# Patient Record
Sex: MALE | Race: BLACK OR AFRICAN AMERICAN | Employment: FULL TIME | URBAN - METROPOLITAN AREA
[De-identification: names, ages, dates, MRNs, and addresses within clinical notes are randomized per-mention and may not be internally consistent; named-entity substitution may affect disease eponyms.]

---

## 2017-09-27 ENCOUNTER — HOSPITAL ENCOUNTER (OUTPATIENT)
Age: 42
Discharge: HOME | End: 2017-09-27
Attending: FAMILY MEDICINE
Payer: COMMERCIAL

## 2017-09-27 VITALS
WEIGHT: 278 LBS | BODY MASS INDEX: 37 KG/M2 | DIASTOLIC BLOOD PRESSURE: 98 MMHG | SYSTOLIC BLOOD PRESSURE: 134 MMHG | HEART RATE: 103 BPM | TEMPERATURE: 99 F | RESPIRATION RATE: 18 BRPM | OXYGEN SATURATION: 95 %

## 2017-09-27 DIAGNOSIS — T14.8XXA LOCAL INFECTION OF WOUND: Primary | ICD-10-CM

## 2017-09-27 DIAGNOSIS — L08.9 LOCAL INFECTION OF WOUND: Primary | ICD-10-CM

## 2017-09-27 DIAGNOSIS — IMO0001 UNCONTROLLED TYPE 2 DIABETES MELLITUS WITHOUT COMPLICATION, WITHOUT LONG-TERM CURRENT USE OF INSULIN: ICD-10-CM

## 2017-09-27 LAB — GLUCOSE BLD-MCNC: 292 MG/DL (ref 65–99)

## 2017-09-27 PROCEDURE — 90471 IMMUNIZATION ADMIN: CPT

## 2017-09-27 PROCEDURE — 99204 OFFICE O/P NEW MOD 45 MIN: CPT

## 2017-09-27 PROCEDURE — 99203 OFFICE O/P NEW LOW 30 MIN: CPT

## 2017-09-27 PROCEDURE — 82962 GLUCOSE BLOOD TEST: CPT

## 2017-09-27 RX ORDER — CEPHALEXIN 500 MG/1
500 CAPSULE ORAL 3 TIMES DAILY
Qty: 21 CAPSULE | Refills: 0 | Status: SHIPPED | OUTPATIENT
Start: 2017-09-27 | End: 2017-09-28

## 2017-09-27 NOTE — ED PROVIDER NOTES
Patient Seen in: [address]    History   Patient presents with:  Laceration Abrasion (integumentary)    Stated Complaint: leg laceration x3 days    HPI    John Singh is a 43year old male previous history of diabetes kvng other systems reviewed and negative except as noted above. PSFH elements reviewed from today and agreed except as otherwise stated in HPI.     Physical Exam   ED Triage Vitals [09/27/17 1404]  BP: 134/98  Pulse: 103  Resp: 18  Temp: 98.8 °F (37.1 °C)  Te Prescribed:  Current Discharge Medication List    START taking these medications    cephALEXin (KEFLEX) 500 MG Oral Cap  Take 1 capsule (500 mg total) by mouth 3 (three) times daily.   Qty: 21 capsule Refills: 0

## 2017-09-27 NOTE — ED NOTES
Pt reports he does not take any of his prescribed medications, has stopped taking his diabetic medications for months now, hasn't seen a doctor since the beginning of the year.  Pt requesting info about [facility] [address], states he might want to get a

## 2017-09-27 NOTE — ED INITIAL ASSESSMENT (HPI)
Pt c/o laceration to right lower leg x 5 days ago from a plastic bin at home after stepping in between plastic bins. Pt reports he cleaned it initially with hydrogen peroxide, and has been putting ointment on it since.  Pt reports he as noticed some redness

## 2017-09-28 ENCOUNTER — OFFICE VISIT (OUTPATIENT)
Dept: INTERNAL MEDICINE CLINIC | Facility: CLINIC | Age: 42
End: 2017-09-28

## 2017-09-28 VITALS
BODY MASS INDEX: 35.98 KG/M2 | WEIGHT: 265.63 LBS | TEMPERATURE: 98 F | HEART RATE: 88 BPM | HEIGHT: 72 IN | DIASTOLIC BLOOD PRESSURE: 76 MMHG | RESPIRATION RATE: 12 BRPM | SYSTOLIC BLOOD PRESSURE: 108 MMHG

## 2017-09-28 DIAGNOSIS — L03.115 CELLULITIS OF RIGHT LEG: Primary | ICD-10-CM

## 2017-09-28 DIAGNOSIS — E78.5 HYPERLIPIDEMIA, UNSPECIFIED HYPERLIPIDEMIA TYPE: ICD-10-CM

## 2017-09-28 DIAGNOSIS — I25.10 ATHEROSCLEROSIS OF CORONARY ARTERY OF NATIVE HEART WITHOUT ANGINA PECTORIS, UNSPECIFIED VESSEL OR LESION TYPE: ICD-10-CM

## 2017-09-28 DIAGNOSIS — E11.65 TYPE 2 DIABETES MELLITUS WITH HYPERGLYCEMIA, WITHOUT LONG-TERM CURRENT USE OF INSULIN (HCC): ICD-10-CM

## 2017-09-28 DIAGNOSIS — K76.0 FATTY LIVER DISEASE, NONALCOHOLIC: ICD-10-CM

## 2017-09-28 PROCEDURE — 99204 OFFICE O/P NEW MOD 45 MIN: CPT | Performed by: INTERNAL MEDICINE

## 2017-09-28 RX ORDER — PIOGLITAZONEHYDROCHLORIDE 30 MG/1
30 TABLET ORAL DAILY
Qty: 30 TABLET | Refills: 3 | Status: SHIPPED | OUTPATIENT
Start: 2017-09-28 | End: 2018-02-11

## 2017-09-28 RX ORDER — CLINDAMYCIN HYDROCHLORIDE 300 MG/1
300 CAPSULE ORAL 3 TIMES DAILY
Qty: 21 CAPSULE | Refills: 0 | Status: SHIPPED | OUTPATIENT
Start: 2017-09-28 | End: 2017-10-05

## 2017-09-28 RX ORDER — ATORVASTATIN CALCIUM 40 MG/1
40 TABLET, FILM COATED ORAL NIGHTLY
Qty: 30 TABLET | Refills: 3 | Status: SHIPPED | OUTPATIENT
Start: 2017-09-28 | End: 2018-02-11

## 2017-09-28 NOTE — PROGRESS NOTES
[facility]    CHIEF COMPLAINT:  Patient presents with:  Diabetes: Had eye exam 7/17. Pt doesn't know name of facility or doctor's name. Will call with name.    Declines flu shot        HISTORY OF PRESENT ILLNESS:  The patient is a 43year old ye mg total) by mouth daily. Disp: 30 tablet Rfl: 3   atorvastatin (LIPITOR) 40 MG Oral Tab Take 1 tablet (40 mg total) by mouth nightly. For cholesterol.  Disp: 30 tablet Rfl: 3   Clindamycin HCl 300 MG Oral Cap Take 1 capsule (300 mg total) by mouth 3 (three COUNSELING  He is active at work. Works at [employer]. Gets in about 10,000 steps a day.      PAIN ASSESSMENT (Patient reports Pain):No       FALL ASSESSMENT:    Falls in the last 12 months: No    FUNCTIONAL ASSESSMENT (Able to perform all ADLs):  Yes 1.3 (H) 11/12/2016 06:54 AM   TSH 1.69 11/12/2016 06:54 AM          Lab Results  Component Value Date/Time   CHOLEST 250 (H) 11/12/2016 06:54 AM   HDL 31 11/12/2016 06:54 AM   TRIG 496 (H) 11/12/2016 06:54 AM   LDL  11/12/2016 06:54 AM   Comment:     Amanda Refill: 3  - COMP METABOLIC PANEL (14); Future  - LIPID PANEL; Future    4. Cellulitis of right leg  Already improving but keflex causing diarrhea. Stop keflex. Start clindamycin.   - Clindamycin HCl 300 MG Oral Cap;  Take 1 capsule (300 mg total) by mo

## 2017-09-30 ENCOUNTER — LAB ENCOUNTER (OUTPATIENT)
Dept: LAB | Facility: HOSPITAL | Age: 42
End: 2017-09-30
Attending: INTERNAL MEDICINE
Payer: COMMERCIAL

## 2017-09-30 DIAGNOSIS — E11.65 TYPE 2 DIABETES MELLITUS WITH HYPERGLYCEMIA, WITHOUT LONG-TERM CURRENT USE OF INSULIN (HCC): ICD-10-CM

## 2017-09-30 DIAGNOSIS — E78.5 HYPERLIPIDEMIA, UNSPECIFIED HYPERLIPIDEMIA TYPE: Primary | ICD-10-CM

## 2017-09-30 PROCEDURE — 80061 LIPID PANEL: CPT

## 2017-09-30 PROCEDURE — 83036 HEMOGLOBIN GLYCOSYLATED A1C: CPT

## 2017-09-30 PROCEDURE — 82043 UR ALBUMIN QUANTITATIVE: CPT

## 2017-09-30 PROCEDURE — 36415 COLL VENOUS BLD VENIPUNCTURE: CPT

## 2017-09-30 PROCEDURE — 80053 COMPREHEN METABOLIC PANEL: CPT

## 2017-09-30 PROCEDURE — 82570 ASSAY OF URINE CREATININE: CPT

## 2017-10-09 ENCOUNTER — TELEPHONE (OUTPATIENT)
Dept: INTERNAL MEDICINE CLINIC | Facility: CLINIC | Age: 42
End: 2017-10-09

## 2017-10-09 ENCOUNTER — OFFICE VISIT (OUTPATIENT)
Dept: INTERNAL MEDICINE CLINIC | Facility: CLINIC | Age: 42
End: 2017-10-09

## 2017-10-09 VITALS
HEIGHT: 72 IN | BODY MASS INDEX: 35.72 KG/M2 | TEMPERATURE: 98 F | WEIGHT: 263.69 LBS | DIASTOLIC BLOOD PRESSURE: 76 MMHG | SYSTOLIC BLOOD PRESSURE: 118 MMHG | HEART RATE: 72 BPM

## 2017-10-09 DIAGNOSIS — E78.5 HYPERLIPIDEMIA, UNSPECIFIED HYPERLIPIDEMIA TYPE: Primary | ICD-10-CM

## 2017-10-09 DIAGNOSIS — I25.10 ATHEROSCLEROSIS OF CORONARY ARTERY OF NATIVE HEART WITHOUT ANGINA PECTORIS, UNSPECIFIED VESSEL OR LESION TYPE: ICD-10-CM

## 2017-10-09 DIAGNOSIS — L03.115 CELLULITIS OF RIGHT LOWER EXTREMITY: ICD-10-CM

## 2017-10-09 DIAGNOSIS — E11.65 TYPE 2 DIABETES MELLITUS WITH HYPERGLYCEMIA, WITHOUT LONG-TERM CURRENT USE OF INSULIN (HCC): ICD-10-CM

## 2017-10-09 DIAGNOSIS — K76.0 FATTY LIVER DISEASE, NONALCOHOLIC: ICD-10-CM

## 2017-10-09 PROCEDURE — 99214 OFFICE O/P EST MOD 30 MIN: CPT | Performed by: INTERNAL MEDICINE

## 2017-10-09 RX ORDER — FENOFIBRATE 54 MG/1
54 TABLET ORAL DAILY
Qty: 30 TABLET | Refills: 1 | Status: SHIPPED | OUTPATIENT
Start: 2017-10-09 | End: 2017-12-25

## 2017-10-09 NOTE — PROGRESS NOTES
[address] [facility]    CHIEF COMPLAINT:  Patient presents with: Follow - Up: had eye exam in 7/17 by [facility]. [facility]. Auth signed. Declined flu shot        HISTORY OF PRESENT ILLNESS:  Here for follow up.    Cell laceration healing well.  No pus, no induration or tenderness  LUNGS: clear to auscultation  CARDIO: RRR without murmur  GI: good BS's,no masses, HSM or tenderness  MUSCULOSKELETAL:  no edema, muscle strength normal.     DATA:    Results for orders placed o insulin (HCC)  Sugars still elevated. Check once a day but stagger times. Will need to go up on meds but need sugar readings. Bring in to next visit. 3. Fatty liver disease, nonalcoholic  Urged low fat low carb diet. Regular exercise.      4. At

## 2017-10-09 NOTE — TELEPHONE ENCOUNTER
Incoming (mail or fax):  fax  Received from:  [facility]  Documentation given to:  Paz Deutsch

## 2017-10-09 NOTE — TELEPHONE ENCOUNTER
Sent fax request for info from:  [facility] at fax [phone]  [facility] at fax [phone]

## 2017-10-10 NOTE — TELEPHONE ENCOUNTER
Incoming (mail or fax):  fax  Received from:  [facility]  Documentation given to:  Dr Mithc Montague

## 2017-12-25 DIAGNOSIS — E78.5 HYPERLIPIDEMIA, UNSPECIFIED HYPERLIPIDEMIA TYPE: ICD-10-CM

## 2017-12-28 RX ORDER — FENOFIBRATE 54 MG/1
54 TABLET ORAL DAILY
Qty: 30 TABLET | Refills: 0 | Status: SHIPPED | OUTPATIENT
Start: 2017-12-28

## 2018-02-11 DIAGNOSIS — E11.65 TYPE 2 DIABETES MELLITUS WITH HYPERGLYCEMIA, WITHOUT LONG-TERM CURRENT USE OF INSULIN (HCC): ICD-10-CM

## 2018-02-11 DIAGNOSIS — I25.10 ATHEROSCLEROSIS OF CORONARY ARTERY OF NATIVE HEART WITHOUT ANGINA PECTORIS, UNSPECIFIED VESSEL OR LESION TYPE: ICD-10-CM

## 2018-02-11 DIAGNOSIS — E78.5 HYPERLIPIDEMIA, UNSPECIFIED HYPERLIPIDEMIA TYPE: ICD-10-CM

## 2018-02-13 RX ORDER — PIOGLITAZONEHYDROCHLORIDE 30 MG/1
30 TABLET ORAL DAILY
Qty: 30 TABLET | Refills: 0 | Status: SHIPPED | OUTPATIENT
Start: 2018-02-13

## 2018-02-13 RX ORDER — ATORVASTATIN CALCIUM 40 MG/1
40 TABLET, FILM COATED ORAL NIGHTLY
Qty: 30 TABLET | Refills: 0 | Status: SHIPPED | OUTPATIENT
Start: 2018-02-13 | End: 2018-03-23

## 2018-02-13 NOTE — TELEPHONE ENCOUNTER
Please only set up refills for a 30 day supply since he is due for a visit. Should not have 3 refills.

## 2018-02-13 NOTE — TELEPHONE ENCOUNTER
Last OV relevant to medication: 10/9/17  Last refill date: 9/28/17     #/refills: 30/3  When pt was asked to return for OV: 1 mo  Upcoming appt/reason: none, pt cancelled, left message to call back for OV    Lab Results  Component Value Date   A1C 10.2 (H)

## 2018-03-23 DIAGNOSIS — I25.10 ATHEROSCLEROSIS OF CORONARY ARTERY OF NATIVE HEART WITHOUT ANGINA PECTORIS, UNSPECIFIED VESSEL OR LESION TYPE: ICD-10-CM

## 2018-03-23 DIAGNOSIS — E78.5 HYPERLIPIDEMIA, UNSPECIFIED HYPERLIPIDEMIA TYPE: ICD-10-CM

## 2018-03-23 DIAGNOSIS — E11.65 TYPE 2 DIABETES MELLITUS WITH HYPERGLYCEMIA, WITHOUT LONG-TERM CURRENT USE OF INSULIN (HCC): ICD-10-CM

## 2018-03-23 RX ORDER — ATORVASTATIN CALCIUM 40 MG/1
40 TABLET, FILM COATED ORAL NIGHTLY
Qty: 30 TABLET | Refills: 0 | Status: SHIPPED | OUTPATIENT
Start: 2018-03-23 | End: 2018-07-21

## 2018-03-23 NOTE — TELEPHONE ENCOUNTER
Give #7 tablets  Do all DM labs within the week and see Serenity next week  Bring recording of blood sugars as advised by Dr Wade Lara in october

## 2018-03-23 NOTE — TELEPHONE ENCOUNTER
Medication refilled. ONLY given 7 days worth. Left detailed message on patients answering machine to call back.

## 2018-03-23 NOTE — TELEPHONE ENCOUNTER
Last OV relevant to medication: 10/9/17  Last refill date: 2/13/18     #/refills: 30day/0  When pt was asked to return for OV: 1 mo  Upcoming appt/reason: none, left message to call back for OV    Lab Results  Component Value Date   A1C 10.2 (H) 09/30/2017

## 2019-04-05 NOTE — LETTER
11/08/17        Matias Rubio  [address] [place] [address]    7/13/1975     Dear Liudmila Hussein records indicate that you have outstanding lab work and or testing that was ordered for you and has not yet been completed:          Comp Metabolic Panel DM, HTN

## 2020-05-14 ENCOUNTER — TELEPHONE (OUTPATIENT)
Dept: INTERNAL MEDICINE CLINIC | Facility: CLINIC | Age: 45
End: 2020-05-14

## 2020-07-28 ENCOUNTER — HOSPITAL ENCOUNTER (OUTPATIENT)
Age: 45
Discharge: HOME | End: 2020-07-28
Attending: EMERGENCY MEDICINE
Payer: COMMERCIAL

## 2020-07-28 VITALS
TEMPERATURE: 98 F | OXYGEN SATURATION: 98 % | DIASTOLIC BLOOD PRESSURE: 91 MMHG | SYSTOLIC BLOOD PRESSURE: 150 MMHG | HEART RATE: 102 BPM | RESPIRATION RATE: 18 BRPM | WEIGHT: 255 LBS | BODY MASS INDEX: 35 KG/M2

## 2020-07-28 DIAGNOSIS — E11.65 TYPE 2 DIABETES MELLITUS WITH HYPERGLYCEMIA, WITHOUT LONG-TERM CURRENT USE OF INSULIN (HCC): ICD-10-CM

## 2020-07-28 DIAGNOSIS — J02.0 STREPTOCOCCAL SORE THROAT: Primary | ICD-10-CM

## 2020-07-28 DIAGNOSIS — H65.91 RIGHT NON-SUPPURATIVE OTITIS MEDIA: ICD-10-CM

## 2020-07-28 LAB
GLUCOSE BLDC GLUCOMTR-MCNC: 196 MG/DL (ref 70–99)
S PYO AG THROAT QL: POSITIVE

## 2020-07-28 PROCEDURE — 99214 OFFICE O/P EST MOD 30 MIN: CPT

## 2020-07-28 PROCEDURE — 99213 OFFICE O/P EST LOW 20 MIN: CPT

## 2020-07-28 PROCEDURE — 87430 STREP A AG IA: CPT

## 2020-07-28 PROCEDURE — 82962 GLUCOSE BLOOD TEST: CPT

## 2020-07-28 RX ORDER — AZITHROMYCIN 500 MG/1
500 TABLET, FILM COATED ORAL DAILY
Qty: 5 TABLET | Refills: 0 | Status: SHIPPED | OUTPATIENT
Start: 2020-07-28

## 2020-07-28 NOTE — ED PROVIDER NOTES
Patient Seen in: [address] [facility]      History   Patient presents with:  Ear Problem Pain    Stated Complaint: Right ear pain     HPI    The patient is a 80-year-old male with past history of diabetes (currently on no treatment tenderness  Eyes: Nonicteric sclera, no conjunctival injection  ENT: The left TM and left external auditory canal are normal.  The right external auditory canal is normal with scant wax. There is mild fullness and erythema to the right TM.   There is mild media  Type 2 diabetes mellitus with hyperglycemia, without long-term current use of insulin [facility])    Disposition:  Discharge  7/28/2020  9:59 am    Follow-up:  Riri Mckay MD  [address] [place] [address] [phone]      Call

## 2020-07-30 LAB — SARS-COV-2 RNA RESP QL NAA+PROBE: NOT DETECTED

## 2021-03-17 DIAGNOSIS — Z23 NEED FOR VACCINATION: ICD-10-CM

## 2021-05-27 ENCOUNTER — TELEPHONE (OUTPATIENT)
Dept: INTERNAL MEDICINE CLINIC | Facility: CLINIC | Age: 46
End: 2021-05-27

## 2021-09-19 ENCOUNTER — HOSPITAL ENCOUNTER (OUTPATIENT)
Age: 46
Discharge: HOME | End: 2021-09-19
Payer: COMMERCIAL

## 2021-09-19 ENCOUNTER — APPOINTMENT (OUTPATIENT)
Dept: GENERAL RADIOLOGY | Age: 46
End: 2021-09-19
Attending: NURSE PRACTITIONER
Payer: COMMERCIAL

## 2021-09-19 VITALS
OXYGEN SATURATION: 100 % | RESPIRATION RATE: 20 BRPM | DIASTOLIC BLOOD PRESSURE: 90 MMHG | HEART RATE: 88 BPM | SYSTOLIC BLOOD PRESSURE: 152 MMHG | TEMPERATURE: 98 F

## 2021-09-19 DIAGNOSIS — S60.012A CONTUSION OF LEFT THUMB WITHOUT DAMAGE TO NAIL, INITIAL ENCOUNTER: Primary | ICD-10-CM

## 2021-09-19 PROCEDURE — 99213 OFFICE O/P EST LOW 20 MIN: CPT

## 2021-09-19 PROCEDURE — 73140 X-RAY EXAM OF FINGER(S): CPT | Performed by: NURSE PRACTITIONER

## 2021-09-19 NOTE — ED PROVIDER NOTES
Patient Seen in: [facility]    History   Patient presents with:  Fall    Stated Complaint: left thumb pain    HPI    HPI: Sonali Rios is a 55year old male who presents after an injury to the left thumb  that occurred while playing [employer] except as noted above. PSFH elements reviewed from today and agreed except as otherwise stated in HPI.     Physical Exam     ED Triage Vitals [09/19/21 0909]   /90   Pulse 88   Resp 20   Temp 97.9 °F (36.6 °C)   Temp src Temporal   SpO2 100 %   O2 Range of motion is intact. No overlying erythema, warmth or induration to indicate infection. Extremity is neurovascularly intact. No overlying abrasion or laceration. Presentation clinically consistent with sprain.  Instructions given on Rice therapy an

## 2021-09-19 NOTE — ED INITIAL ASSESSMENT (HPI)
[place] playing volleyball yesterday. Left thumb pain. Abrasions noted to left knee. Last TDaP 2 years ago.

## 2023-04-04 ENCOUNTER — OFFICE VISIT (OUTPATIENT)
Dept: INTERNAL MEDICINE CLINIC | Facility: CLINIC | Age: 48
End: 2023-04-04

## 2023-04-04 VITALS
DIASTOLIC BLOOD PRESSURE: 85 MMHG | TEMPERATURE: 98 F | HEIGHT: 72 IN | WEIGHT: 252 LBS | HEART RATE: 82 BPM | BODY MASS INDEX: 34.13 KG/M2 | SYSTOLIC BLOOD PRESSURE: 135 MMHG | OXYGEN SATURATION: 96 %

## 2023-04-04 DIAGNOSIS — M79.671 ACUTE FOOT PAIN, RIGHT: ICD-10-CM

## 2023-04-04 DIAGNOSIS — R10.10 PAIN OF UPPER ABDOMEN: Primary | ICD-10-CM

## 2023-04-04 DIAGNOSIS — Z00.00 ANNUAL PHYSICAL EXAM: ICD-10-CM

## 2023-04-04 PROCEDURE — 3008F BODY MASS INDEX DOCD: CPT | Performed by: INTERNAL MEDICINE

## 2023-04-04 PROCEDURE — 99204 OFFICE O/P NEW MOD 45 MIN: CPT | Performed by: INTERNAL MEDICINE

## 2023-04-04 PROCEDURE — 3075F SYST BP GE 130 - 139MM HG: CPT | Performed by: INTERNAL MEDICINE

## 2023-04-04 PROCEDURE — 3079F DIAST BP 80-89 MM HG: CPT | Performed by: INTERNAL MEDICINE

## 2023-04-04 RX ORDER — PANTOPRAZOLE SODIUM 40 MG/1
40 TABLET, DELAYED RELEASE ORAL
Qty: 3 TABLET | Refills: 0 | Status: SHIPPED | OUTPATIENT
Start: 2023-04-04

## 2023-04-25 ENCOUNTER — LAB ENCOUNTER (OUTPATIENT)
Dept: LAB | Age: 48
End: 2023-04-25
Attending: INTERNAL MEDICINE
Payer: COMMERCIAL

## 2023-04-25 ENCOUNTER — HOSPITAL ENCOUNTER (OUTPATIENT)
Dept: ULTRASOUND IMAGING | Age: 48
Discharge: HOME | End: 2023-04-25
Attending: INTERNAL MEDICINE
Payer: COMMERCIAL

## 2023-04-25 DIAGNOSIS — R10.10 PAIN OF UPPER ABDOMEN: ICD-10-CM

## 2023-04-25 DIAGNOSIS — Z00.00 ANNUAL PHYSICAL EXAM: ICD-10-CM

## 2023-04-25 LAB
ALBUMIN SERPL-MCNC: 3.7 G/DL (ref 3.4–5)
ALBUMIN/GLOB SERPL: 1.1 {RATIO} (ref 1–2)
ALP LIVER SERPL-CCNC: 54 U/L
ALT SERPL-CCNC: 40 U/L
ANION GAP SERPL CALC-SCNC: 12 MMOL/L (ref 0–18)
AST SERPL-CCNC: 32 U/L (ref 15–37)
BASOPHILS # BLD AUTO: 0.02 X10(3) UL (ref 0–0.2)
BASOPHILS NFR BLD AUTO: 0.4 %
BILIRUB SERPL-MCNC: 1.6 MG/DL (ref 0.1–2)
BUN BLD-MCNC: 12 MG/DL (ref 7–18)
BUN/CREAT SERPL: 15 (ref 10–20)
CALCIUM BLD-MCNC: 9.7 MG/DL (ref 8.5–10.1)
CHLORIDE SERPL-SCNC: 101 MMOL/L (ref 98–112)
CHOLEST SERPL-MCNC: 302 MG/DL (ref ?–200)
CO2 SERPL-SCNC: 25 MMOL/L (ref 21–32)
CREAT BLD-MCNC: 0.8 MG/DL
DEPRECATED RDW RBC AUTO: 38.3 FL (ref 35.1–46.3)
EOSINOPHIL # BLD AUTO: 0.08 X10(3) UL (ref 0–0.7)
EOSINOPHIL NFR BLD AUTO: 1.6 %
ERYTHROCYTE [DISTWIDTH] IN BLOOD BY AUTOMATED COUNT: 11.7 % (ref 11–15)
EST. AVERAGE GLUCOSE BLD GHB EST-MCNC: 283 MG/DL (ref 68–126)
FASTING PATIENT LIPID ANSWER: YES
FASTING STATUS PATIENT QL REPORTED: YES
GFR SERPLBLD BASED ON 1.73 SQ M-ARVRAT: 110 ML/MIN/1.73M2 (ref 60–?)
GLOBULIN PLAS-MCNC: 3.5 G/DL (ref 2.8–4.4)
GLUCOSE BLD-MCNC: 298 MG/DL (ref 70–99)
HBA1C MFR BLD: 11.5 % (ref ?–5.7)
HCT VFR BLD AUTO: 43.7 %
HDLC SERPL-MCNC: 43 MG/DL (ref 40–59)
HGB BLD-MCNC: 15.4 G/DL
IMM GRANULOCYTES # BLD AUTO: 0.01 X10(3) UL (ref 0–1)
IMM GRANULOCYTES NFR BLD: 0.2 %
LDLC SERPL DIRECT ASSAY-MCNC: 123 MG/DL (ref ?–100)
LYMPHOCYTES # BLD AUTO: 1.55 X10(3) UL (ref 1–4)
LYMPHOCYTES NFR BLD AUTO: 31.1 %
MCH RBC QN AUTO: 31.7 PG (ref 26–34)
MCHC RBC AUTO-ENTMCNC: 35.2 G/DL (ref 31–37)
MCV RBC AUTO: 89.9 FL
MONOCYTES # BLD AUTO: 0.36 X10(3) UL (ref 0.1–1)
MONOCYTES NFR BLD AUTO: 7.2 %
NEUTROPHILS # BLD AUTO: 2.97 X10 (3) UL (ref 1.5–7.7)
NEUTROPHILS # BLD AUTO: 2.97 X10(3) UL (ref 1.5–7.7)
NEUTROPHILS NFR BLD AUTO: 59.5 %
NONHDLC SERPL-MCNC: 259 MG/DL (ref ?–130)
OSMOLALITY SERPL CALC.SUM OF ELEC: 297 MOSM/KG (ref 275–295)
PLATELET # BLD AUTO: 175 10(3)UL (ref 150–450)
POTASSIUM SERPL-SCNC: 3.9 MMOL/L (ref 3.5–5.1)
PROT SERPL-MCNC: 7.2 G/DL (ref 6.4–8.2)
RBC # BLD AUTO: 4.86 X10(6)UL
SODIUM SERPL-SCNC: 138 MMOL/L (ref 136–145)
TRIGL SERPL-MCNC: 914 MG/DL (ref 30–149)
TSI SER-ACNC: 1.59 MIU/ML (ref 0.36–3.74)
WBC # BLD AUTO: 5 X10(3) UL (ref 4–11)

## 2023-04-25 PROCEDURE — 80053 COMPREHEN METABOLIC PANEL: CPT

## 2023-04-25 PROCEDURE — 85025 COMPLETE CBC W/AUTO DIFF WBC: CPT

## 2023-04-25 PROCEDURE — 36415 COLL VENOUS BLD VENIPUNCTURE: CPT

## 2023-04-25 PROCEDURE — 83721 ASSAY OF BLOOD LIPOPROTEIN: CPT

## 2023-04-25 PROCEDURE — 83036 HEMOGLOBIN GLYCOSYLATED A1C: CPT

## 2023-04-25 PROCEDURE — 76705 ECHO EXAM OF ABDOMEN: CPT | Performed by: INTERNAL MEDICINE

## 2023-04-25 PROCEDURE — 84443 ASSAY THYROID STIM HORMONE: CPT

## 2023-04-25 PROCEDURE — 80061 LIPID PANEL: CPT

## 2023-04-25 NOTE — PROGRESS NOTES
Us gallbladder is ok , there is increase hepatic echogenicity of the liver most likely fatty liver watch diet , avoid fried food, red meat. ---PT HAS VIEWED RESULTS ON [facility].

## 2023-04-26 ENCOUNTER — TELEPHONE (OUTPATIENT)
Dept: INTERNAL MEDICINE CLINIC | Facility: CLINIC | Age: 48
End: 2023-04-26

## 2023-08-03 RX ORDER — FENOFIBRATE 145 MG/1
145 TABLET, COATED ORAL DAILY
Qty: 90 TABLET | Refills: 3 | Status: SHIPPED | OUTPATIENT
Start: 2023-08-03

## 2023-08-03 NOTE — TELEPHONE ENCOUNTER
Refill passed per [place] [employer] protocol.   Requested Prescriptions   Pending Prescriptions Disp Refills    FENOFIBRATE 145 MG Oral Tab [Pharmacy Med Name: FENOFIBRATE 145 MG TABLET] 30 tablet 2     Sig: TAKE 1 TABLET BY MOUTH EVERY DAY       Cholesterol Medication Protocol Passed - 8/3/2023 12:59 AM        Passed - ALT in past 12 months        Passed - LDL in past 12 months        Passed - Last ALT < 80     Lab Results   Component Value Date    ALT 40 04/25/2023             Passed - Last LDL < 130     Lab Results   Component Value Date    LDL  04/25/2023      Comment:      Unable to calculate LDL due to Triglycerides >800 mg/dL        Desirable <100 mg/dL   Borderline 100-129 mg/dL   High     >=130mg/dL                 Passed - In person appointment or virtual visit in the past 12 mos or appointment in next 3 mos     Recent Outpatient Visits              4 months ago Pain of upper abdomen    [address], Huseyin Muñiz MD    Office Visit    5 years ago Hyperlipidemia, unspecified hyperlipidemia type    [address], Estela James MD    Office Visit    5 years ago Cellulitis of right leg    [address], Ean Khoury MD    Office Visit    6 years ago Left ureteral stone    Deondre Oropeza DO    Office Visit                         Recent Outpatient Visits              4 months ago Pain of upper abdomen    [address], Huseyin Muñiz MD    Office Visit    5 years ago Hyperlipidemia, unspecified hyperlipidemia type    [address], Ean Khoury MD    Office Visit    5 years ago Cellulitis of right leg    [address], Ean Khoury MD    Office Visit    6 years ago Left ureteral stone    [facility], [address], [address], [place], [place]    Office Visit

## 2024-06-04 ENCOUNTER — OFFICE VISIT (OUTPATIENT)
Dept: INTERNAL MEDICINE CLINIC | Facility: CLINIC | Age: 49
End: 2024-06-04
Payer: COMMERCIAL

## 2024-06-04 ENCOUNTER — LAB ENCOUNTER (OUTPATIENT)
Dept: LAB | Age: 49
End: 2024-06-04
Attending: INTERNAL MEDICINE
Payer: COMMERCIAL

## 2024-06-04 VITALS
HEART RATE: 90 BPM | BODY MASS INDEX: 34.02 KG/M2 | DIASTOLIC BLOOD PRESSURE: 74 MMHG | WEIGHT: 251.19 LBS | SYSTOLIC BLOOD PRESSURE: 136 MMHG | HEIGHT: 72 IN

## 2024-06-04 DIAGNOSIS — R10.12 LEFT UPPER QUADRANT ABDOMINAL PAIN: ICD-10-CM

## 2024-06-04 DIAGNOSIS — E11.9 TYPE 2 DIABETES MELLITUS WITHOUT COMPLICATION, WITHOUT LONG-TERM CURRENT USE OF INSULIN (HCC): ICD-10-CM

## 2024-06-04 DIAGNOSIS — R10.12 LEFT UPPER QUADRANT ABDOMINAL PAIN: Primary | ICD-10-CM

## 2024-06-04 LAB
ALBUMIN SERPL-MCNC: 4.7 G/DL (ref 3.2–4.8)
ALBUMIN/GLOB SERPL: 1.8 {RATIO} (ref 1–2)
ALP LIVER SERPL-CCNC: 56 U/L
ALT SERPL-CCNC: 26 U/L
ANION GAP SERPL CALC-SCNC: 11 MMOL/L (ref 0–18)
AST SERPL-CCNC: 27 U/L (ref ?–34)
BASOPHILS # BLD AUTO: 0.02 X10(3) UL (ref 0–0.2)
BASOPHILS NFR BLD AUTO: 0.4 %
BILIRUB SERPL-MCNC: 1.5 MG/DL (ref 0.3–1.2)
BILIRUB UR QL: NEGATIVE
BUN BLD-MCNC: 11 MG/DL (ref 9–23)
BUN/CREAT SERPL: 14.3 (ref 10–20)
CALCIUM BLD-MCNC: 9.9 MG/DL (ref 8.7–10.4)
CHLORIDE SERPL-SCNC: 104 MMOL/L (ref 98–112)
CLARITY UR: CLEAR
CO2 SERPL-SCNC: 22 MMOL/L (ref 21–32)
CREAT BLD-MCNC: 0.77 MG/DL
DEPRECATED RDW RBC AUTO: 39 FL (ref 35.1–46.3)
EGFRCR SERPLBLD CKD-EPI 2021: 110 ML/MIN/1.73M2 (ref 60–?)
EOSINOPHIL # BLD AUTO: 0.09 X10(3) UL (ref 0–0.7)
EOSINOPHIL NFR BLD AUTO: 1.8 %
ERYTHROCYTE [DISTWIDTH] IN BLOOD BY AUTOMATED COUNT: 11.9 % (ref 11–15)
FASTING STATUS PATIENT QL REPORTED: YES
GLOBULIN PLAS-MCNC: 2.6 G/DL (ref 2–3.5)
GLUCOSE BLD-MCNC: 275 MG/DL (ref 70–99)
GLUCOSE UR-MCNC: >1000 MG/DL
HCT VFR BLD AUTO: 42.8 %
HGB BLD-MCNC: 15.5 G/DL
HGB UR QL STRIP.AUTO: NEGATIVE
IMM GRANULOCYTES # BLD AUTO: 0 X10(3) UL (ref 0–1)
IMM GRANULOCYTES NFR BLD: 0 %
KETONES UR-MCNC: NEGATIVE MG/DL
LEUKOCYTE ESTERASE UR QL STRIP.AUTO: NEGATIVE
LIPASE SERPL-CCNC: 41 U/L (ref 12–53)
LYMPHOCYTES # BLD AUTO: 1.33 X10(3) UL (ref 1–4)
LYMPHOCYTES NFR BLD AUTO: 26.9 %
MCH RBC QN AUTO: 32.6 PG (ref 26–34)
MCHC RBC AUTO-ENTMCNC: 36.2 G/DL (ref 31–37)
MCV RBC AUTO: 89.9 FL
MONOCYTES # BLD AUTO: 0.39 X10(3) UL (ref 0.1–1)
MONOCYTES NFR BLD AUTO: 7.9 %
NEUTROPHILS # BLD AUTO: 3.12 X10 (3) UL (ref 1.5–7.7)
NEUTROPHILS # BLD AUTO: 3.12 X10(3) UL (ref 1.5–7.7)
NEUTROPHILS NFR BLD AUTO: 63 %
NITRITE UR QL STRIP.AUTO: NEGATIVE
OSMOLALITY SERPL CALC.SUM OF ELEC: 293 MOSM/KG (ref 275–295)
PH UR: 5.5 [PH] (ref 5–8)
PLATELET # BLD AUTO: 179 10(3)UL (ref 150–450)
POTASSIUM SERPL-SCNC: 3.9 MMOL/L (ref 3.5–5.1)
PROT SERPL-MCNC: 7.3 G/DL (ref 5.7–8.2)
PROT UR-MCNC: NEGATIVE MG/DL
RBC # BLD AUTO: 4.76 X10(6)UL
SODIUM SERPL-SCNC: 137 MMOL/L (ref 136–145)
SP GR UR STRIP: >1.03 (ref 1–1.03)
UROBILINOGEN UR STRIP-ACNC: NORMAL
WBC # BLD AUTO: 5 X10(3) UL (ref 4–11)

## 2024-06-04 PROCEDURE — 80053 COMPREHEN METABOLIC PANEL: CPT

## 2024-06-04 PROCEDURE — 81003 URINALYSIS AUTO W/O SCOPE: CPT

## 2024-06-04 PROCEDURE — 83690 ASSAY OF LIPASE: CPT

## 2024-06-04 PROCEDURE — 3075F SYST BP GE 130 - 139MM HG: CPT | Performed by: INTERNAL MEDICINE

## 2024-06-04 PROCEDURE — 3008F BODY MASS INDEX DOCD: CPT | Performed by: INTERNAL MEDICINE

## 2024-06-04 PROCEDURE — 36415 COLL VENOUS BLD VENIPUNCTURE: CPT

## 2024-06-04 PROCEDURE — 99214 OFFICE O/P EST MOD 30 MIN: CPT | Performed by: INTERNAL MEDICINE

## 2024-06-04 PROCEDURE — 85025 COMPLETE CBC W/AUTO DIFF WBC: CPT

## 2024-06-04 PROCEDURE — 3078F DIAST BP <80 MM HG: CPT | Performed by: INTERNAL MEDICINE

## 2024-06-04 NOTE — PATIENT INSTRUCTIONS
Await results of blood and urine testing  Please schedule a CT scan  I would recommend a follow-up physical soon to reevaluate your diabetes
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
## 2024-06-17 ENCOUNTER — TELEPHONE (OUTPATIENT)
Dept: CASE MANAGEMENT | Age: 49
End: 2024-06-17

## 2024-06-17 NOTE — TELEPHONE ENCOUNTER
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    He is scheduled for tomorrow June 18, 2024    They are requesting additional detailed clinical reasoning for the test.    If you or a clinical staff member are available you can call [facility] - [phone], and use MEM ID: [id number] as your reference.    Thank you  Eloina BALBUENA

## 2024-06-18 ENCOUNTER — HOSPITAL ENCOUNTER (OUTPATIENT)
Dept: CT IMAGING | Facility: HOSPITAL | Age: 49
Discharge: HOME | End: 2024-06-18
Attending: INTERNAL MEDICINE

## 2024-06-18 DIAGNOSIS — R10.12 LEFT UPPER QUADRANT ABDOMINAL PAIN: ICD-10-CM

## 2024-06-18 PROCEDURE — 74176 CT ABD & PELVIS W/O CONTRAST: CPT | Performed by: INTERNAL MEDICINE

## (undated) NOTE — LETTER
6/4/2020      Tyra Long  [address] [facility] [address]    7/13/1975      Dear Tyra Long,        Recently, you came up on a list of patients who have not been seen in my office for quite some time.   In order to provide the best care possible, I

## (undated) NOTE — LETTER
Date & Time: 9/19/2021, 9:41 AM  Patient: Kim Smith  Encounter Provider(s):    FRANCISCO Jack       To Whom It May Concern:    Kim Smith was seen and treated in our department on 9/19/2021. He can return to work 09/21/2021.     If y

## (undated) NOTE — LETTER
6/15/2021      [address]  [address] [place] [address]    7/13/1975      Dear Mora Raman,      After careful review of your medical record it has come to our attention that you are Overdue for an Office Visit, Imaging, and Lab Work with

## (undated) NOTE — LETTER
Date & Time: 7/28/2020, 10:04 AM  Patient: Audra Tejada  Encounter Provider(s):    Alexander Smith MD       To Whom It May Concern:    Audra Tejada was seen and treated in our department on 7/28/2020.  He should not return to work until negative